# Patient Record
Sex: FEMALE | Race: OTHER | URBAN - METROPOLITAN AREA
[De-identification: names, ages, dates, MRNs, and addresses within clinical notes are randomized per-mention and may not be internally consistent; named-entity substitution may affect disease eponyms.]

---

## 2018-05-17 ENCOUNTER — APPOINTMENT (RX ONLY)
Dept: URBAN - METROPOLITAN AREA CLINIC 23 | Facility: CLINIC | Age: 43
Setting detail: DERMATOLOGY
End: 2018-05-17

## 2018-05-17 DIAGNOSIS — Z41.9 ENCOUNTER FOR PROCEDURE FOR PURPOSES OTHER THAN REMEDYING HEALTH STATE, UNSPECIFIED: ICD-10-CM

## 2018-05-17 DIAGNOSIS — L71.8 OTHER ROSACEA: ICD-10-CM

## 2018-05-17 PROCEDURE — ? PRESCRIPTION

## 2018-05-17 PROCEDURE — ? DYSPORT

## 2018-05-17 PROCEDURE — ? PRESCRIPTION SAMPLES PROVIDED

## 2018-05-17 RX ORDER — AZELAIC ACID 0.15 G/G
GEL TOPICAL QD
Qty: 1 | Refills: 3 | Status: ERX | COMMUNITY
Start: 2018-05-17

## 2018-05-17 RX ADMIN — AZELAIC ACID: 0.15 GEL TOPICAL at 18:19

## 2018-05-17 NOTE — PROCEDURE: DYSPORT
Inferior Lateral Orbicularis Oculi Units: 0
Topical Anesthesia?: 15% lidocaine, 5% prilocaine
Additional Area 5 Location: bunny lines
Price (Use Numbers Only, No Special Characters Or $): 0.00
Detail Level: Zone
Additional Area 3 Location: glabella
Additional Area 1 Units: [address]
Expiration Date (Month Year): 10/31/2018
Lot #: [id number]
Additional Area 4 Location: chin
Additional Area 1 Location: lateral eyes
Dilution (U/ 0.1cc): 1.5
Consent: Written consent obtained. Risks include but not limited to lid/brow ptosis, bruising, swelling, diplopia, temporary effect, incomplete chemical denervation.
Glabellar Complex Units: [id number] [facility]

## 2022-12-12 NOTE — PROCEDURE: PRESCRIPTION SAMPLES PROVIDED
pt reports worsening lower back and RLE pain, states its chronic. denies any recently falls or trauma. MD orders noted and initiated.
Detail Level: Zone
Samples Given: Reji Leslie